# Patient Record
Sex: FEMALE | Employment: FULL TIME | URBAN - METROPOLITAN AREA
[De-identification: names, ages, dates, MRNs, and addresses within clinical notes are randomized per-mention and may not be internally consistent; named-entity substitution may affect disease eponyms.]

---

## 2023-03-07 LAB
ANION GAP IN SER/PLAS: 13 MMOL/L (ref 10–20)
CALCIUM (MG/DL) IN SER/PLAS: 9.5 MG/DL (ref 8.6–10.3)
CARBON DIOXIDE, TOTAL (MMOL/L) IN SER/PLAS: 28 MMOL/L (ref 21–32)
CHLORIDE (MMOL/L) IN SER/PLAS: 102 MMOL/L (ref 98–107)
CREATININE (MG/DL) IN SER/PLAS: 0.67 MG/DL (ref 0.5–1.05)
GFR FEMALE: >90 ML/MIN/1.73M2
GLUCOSE (MG/DL) IN SER/PLAS: 78 MG/DL (ref 74–99)
POTASSIUM (MMOL/L) IN SER/PLAS: 3.9 MMOL/L (ref 3.5–5.3)
SODIUM (MMOL/L) IN SER/PLAS: 139 MMOL/L (ref 136–145)
UREA NITROGEN (MG/DL) IN SER/PLAS: 13 MG/DL (ref 6–23)

## 2023-03-14 ENCOUNTER — TELEPHONE (OUTPATIENT)
Dept: PRIMARY CARE | Facility: CLINIC | Age: 33
End: 2023-03-14

## 2023-10-20 ENCOUNTER — TELEPHONE (OUTPATIENT)
Dept: PRIMARY CARE | Facility: CLINIC | Age: 33
End: 2023-10-20

## 2023-10-20 DIAGNOSIS — J32.9 SINUSITIS, UNSPECIFIED CHRONICITY, UNSPECIFIED LOCATION: Primary | ICD-10-CM

## 2023-10-20 PROBLEM — R91.8 LUNG NODULES: Status: RESOLVED | Noted: 2023-10-20 | Resolved: 2023-10-20

## 2023-10-20 RX ORDER — NITROFURANTOIN 25; 75 MG/1; MG/1
100 CAPSULE ORAL DAILY
COMMUNITY

## 2023-10-20 RX ORDER — AMOXICILLIN 500 MG/1
500 CAPSULE ORAL EVERY 12 HOURS SCHEDULED
Qty: 14 CAPSULE | Refills: 0 | Status: SHIPPED | OUTPATIENT
Start: 2023-10-20 | End: 2023-10-27

## 2023-10-20 RX ORDER — FLUTICASONE PROPIONATE 50 MCG
1 SPRAY, SUSPENSION (ML) NASAL DAILY
Qty: 16 G | Refills: 5 | Status: SHIPPED | OUTPATIENT
Start: 2023-10-20 | End: 2023-11-11

## 2023-10-20 NOTE — TELEPHONE ENCOUNTER
Okay, let  me know if you have any concerns or worsen  Feel better     [facility] Excellence  [address] [facility]  [address] [place] [address]  Phone [phone]  Fax [phone]      From: Nadiya Patino <[email or web address]>   Sent: Friday, October 20, 2023 10:48 AM  To: Jackie Gonzalez <[email or web address]>  Subject: Re: Question      Thank you!! [facility] [address] is my usual pharmacy.     Jeanna         On Oct 20, 2023, at 10:28 AM, Jackie Gonzalez <[email or web address]> wrote:  ?   Florentino Meeks,  First of all Congratulations!!  I would recommend going on Amoxicillin which is safe during Pregnancy.  You could also use Nasal Flonase and Tylenol  to see if that helps some of your congestion and discomfort respectively.  I will call in the Amoxicillin for you and Flonase. Which Pharmacy ?      [facility]  [address] [place] [address]  [address] [place] [address]  Phone [phone]  Fax [phone]  [email or web address]>     From: Nadiya Patino <[email or web address]>   Sent: Friday, October 20, 2023 7:35 AM  To: Jackie Gonzalez <[email or web address]>  Subject: Question      Hi Dr Gonzalez - Hope you're well! I think I unfortunately may have caught whatever Rigo is dealing with. Sinus pain, congestion, etc. it started this week on Monday. Im not sure if Rigo told you, but im also 22 weeks pregnant ?? my OB is out   Hi Dr Carlos Ortiz     Hope you're well! I think I unfortunately may have caught whatever Rigo is dealing with. Sinus pain, congestion, etc. it started this week on Monday. Im not sure if Rigo told you, but im also 22 weeks pregnant ?? my OB is out on medical leave this month so I wanted to reach out and see if you recommend any over the counter medication that would be ok for me to take.      I am currently on a daily macrobid to help with recurring UTIs.      Is there  anything you recommend I take to help relieve this sinus pain and congestion ?         Thanks-  Jeanna Patino     Visit us at [email or web address].    The enclosed information is STRICTLY CONFIDENTIAL and is intended for the  use of the addressee only. [facility] and its affiliates disclaim  any responsibility for unauthorized disclosure of this information to anyone  other than the addressee.    Federal and [place] law protect patient medical information, including  psychiatric_disorders, (H.I.V) test results, A.I.Ds-related conditions,  alcohol, and/or drug_dependence or abuse disclosed in this email. Federal  regulation (42 CFR Part 2) and [place] Revised Code section 5122.31 and  3701.243 prohibit disclosure of this information without the specific  written consent of the person to whom it pertains, or as otherwise permitted  b

## 2023-11-11 DIAGNOSIS — J32.9 SINUSITIS, UNSPECIFIED CHRONICITY, UNSPECIFIED LOCATION: ICD-10-CM

## 2023-11-11 RX ORDER — FLUTICASONE PROPIONATE 50 MCG
2 SPRAY, SUSPENSION (ML) NASAL DAILY
Qty: 48 ML | Refills: 2 | Status: SHIPPED | OUTPATIENT
Start: 2023-11-11 | End: 2024-11-10

## 2023-11-15 ENCOUNTER — TELEPHONE (OUTPATIENT)
Dept: PRIMARY CARE | Facility: CLINIC | Age: 33
End: 2023-11-15

## 2023-11-15 DIAGNOSIS — J40 BRONCHITIS: Primary | ICD-10-CM

## 2023-11-15 RX ORDER — ALBUTEROL SULFATE 90 UG/1
2 AEROSOL, METERED RESPIRATORY (INHALATION) EVERY 4 HOURS PRN
Qty: 8.5 G | Refills: 0 | Status: SHIPPED | OUTPATIENT
Start: 2023-11-15 | End: 2024-11-14

## 2023-11-15 RX ORDER — AZITHROMYCIN 250 MG/1
TABLET, FILM COATED ORAL
Qty: 6 TABLET | Refills: 0 | Status: SHIPPED | OUTPATIENT
Start: 2023-11-15 | End: 2023-11-20

## 2023-11-15 NOTE — TELEPHONE ENCOUNTER
"Last week started with a cold.   Symptoms included feeling Congested and having a runny nose.  Has since progressed to chest tightness and productive cough.  Feeling worse. She still endorses facial congestion but no sinus pain.  She feels as if the infection has \"settled in my chest\".  No fever.  No history of asthma but she does hava history of atypical CAP requiring antibiotic and inhaler.  She feels similar to last year.  Ill for 9 days now and feels as if it is progressing.  Jeanna is 24 weeks pregnant.    it does sound like she needs antibiotics given that her  symptoms are going on day 9 and worsening and she is starting to feel more ill.  it would be important to treat her breathing issues and chest tightness with an albuterol inhaler along with azithromycin.  Mucinex for her cough and congestion  I encouraged her to get the final approval for the  treatment plan above from her  OB.  I did ask her to keep me posted.         "